# Patient Record
Sex: FEMALE | Race: WHITE | NOT HISPANIC OR LATINO | Employment: UNEMPLOYED | URBAN - METROPOLITAN AREA
[De-identification: names, ages, dates, MRNs, and addresses within clinical notes are randomized per-mention and may not be internally consistent; named-entity substitution may affect disease eponyms.]

---

## 2017-08-15 ENCOUNTER — GENERIC CONVERSION - ENCOUNTER (OUTPATIENT)
Dept: GYNECOLOGIC ONCOLOGY | Facility: CLINIC | Age: 72
End: 2017-08-15

## 2017-11-06 ENCOUNTER — GENERIC CONVERSION - ENCOUNTER (OUTPATIENT)
Dept: GYNECOLOGIC ONCOLOGY | Facility: CLINIC | Age: 72
End: 2017-11-06

## 2018-01-11 ENCOUNTER — GENERIC CONVERSION - ENCOUNTER (OUTPATIENT)
Dept: GYNECOLOGIC ONCOLOGY | Facility: CLINIC | Age: 73
End: 2018-01-11

## 2018-01-22 VITALS
SYSTOLIC BLOOD PRESSURE: 138 MMHG | RESPIRATION RATE: 16 BRPM | HEART RATE: 100 BPM | BODY MASS INDEX: 38.89 KG/M2 | WEIGHT: 242 LBS | TEMPERATURE: 97.3 F | HEIGHT: 66 IN | DIASTOLIC BLOOD PRESSURE: 88 MMHG

## 2019-01-04 DIAGNOSIS — C54.1 ENDOMETRIAL CANCER (HCC): Primary | ICD-10-CM

## 2019-01-17 ENCOUNTER — OFFICE VISIT (OUTPATIENT)
Dept: GYNECOLOGIC ONCOLOGY | Facility: CLINIC | Age: 74
End: 2019-01-17
Payer: COMMERCIAL

## 2019-01-17 VITALS
SYSTOLIC BLOOD PRESSURE: 120 MMHG | BODY MASS INDEX: 36.96 KG/M2 | HEART RATE: 100 BPM | TEMPERATURE: 97.4 F | RESPIRATION RATE: 16 BRPM | WEIGHT: 230 LBS | DIASTOLIC BLOOD PRESSURE: 68 MMHG | HEIGHT: 66 IN

## 2019-01-17 DIAGNOSIS — Z85.42 ENCOUNTER FOR FOLLOW-UP SURVEILLANCE OF ENDOMETRIAL CANCER: Primary | ICD-10-CM

## 2019-01-17 DIAGNOSIS — Z85.42 HISTORY OF ENDOMETRIAL CANCER: ICD-10-CM

## 2019-01-17 DIAGNOSIS — Z08 ENCOUNTER FOR FOLLOW-UP SURVEILLANCE OF ENDOMETRIAL CANCER: Primary | ICD-10-CM

## 2019-01-17 PROBLEM — C54.1 ENDOMETRIAL CANCER (HCC): Status: ACTIVE | Noted: 2019-01-17

## 2019-01-17 PROCEDURE — 99212 OFFICE O/P EST SF 10 MIN: CPT | Performed by: OBSTETRICS & GYNECOLOGY

## 2019-01-17 RX ORDER — METFORMIN HYDROCHLORIDE 500 MG/1
TABLET, EXTENDED RELEASE ORAL
COMMUNITY

## 2019-01-17 RX ORDER — CHLORHEXIDINE/GLYCERIN/HE-CELL
1 JELLY (GRAM) TOPICAL DAILY
COMMUNITY

## 2019-01-17 RX ORDER — MULTIVIT-MIN/IRON/FOLIC ACID/K 18-600-40
CAPSULE ORAL
COMMUNITY

## 2019-01-17 RX ORDER — LETROZOLE 2.5 MG/1
1 TABLET, FILM COATED ORAL DAILY
COMMUNITY

## 2019-01-17 RX ORDER — TELMISARTAN AND HYDROCHLORTHIAZIDE 80; 25 MG/1; MG/1
TABLET ORAL
COMMUNITY
Start: 2011-04-18

## 2019-01-17 RX ORDER — HYDROCHLOROTHIAZIDE 25 MG/1
TABLET ORAL
COMMUNITY

## 2019-01-17 RX ORDER — ANTIOX #8/OM3/DHA/EPA/LUT/ZEAX 250-2.5 MG
CAPSULE ORAL
COMMUNITY

## 2019-01-17 RX ORDER — ATORVASTATIN CALCIUM 20 MG/1
TABLET, FILM COATED ORAL
COMMUNITY
Start: 2014-02-06

## 2019-01-17 NOTE — ASSESSMENT & PLAN NOTE
78-year-old with a history of stage IB grade 3 endometrial cancer  She is clinically without evidence of disease recurrence    Her performance status is 0   1  Return in 1 year for endometrial cancer surveillance

## 2019-01-17 NOTE — PROGRESS NOTES
Assessment/Plan:    Problem List Items Addressed This Visit        Genitourinary    Encounter for follow-up surveillance of endometrial cancer - Primary    Relevant Medications    letrozole (FEMARA) 2 5 mg tablet       Other    History of endometrial cancer     73-year-old with a history of stage IB grade 3 endometrial cancer  She is clinically without evidence of disease recurrence  Her performance status is 0   1  Return in 1 year for endometrial cancer surveillance                 CHIEF COMPLAINT:  Endometrial cancer surveillance      Problem:  Cancer Staging  History of endometrial cancer  Staging form: Corpus Uteri - Carcinoma, AJCC 8th Edition  - Clinical: Stage IB (cT1b, cN0, cM0) - Signed by Will Duffy MD on 1/17/2019        Previous therapy:     History of endometrial cancer    8/2009 Initial Diagnosis     Endometrial cancer ([facility] [address] )         8/2009 Surgery     Total abdominal hysterectomy, bilateral salpingo-oophorectomy, lymph node dissection, omental biopsy  Grade 3, 95% myometrial invasion, negative nodes, positive LV SI          - 12/2010 Radiation     50 4 Gy to the pelvis              Patient ID: Claudia Calvin is a 68 y o  female  73-year-old returns for endometrial cancer surveillance  She has no new complaints  No abdominal pain, pelvic pain, or vaginal bleeding  In the interim, she has been diagnosed with type 2 diabetes  Her quality of life is good  The following portions of the patient's history were reviewed and updated as appropriate: allergies, current medications, past family history, past medical history, past social history, past surgical history and problem list     Review of Systems   Constitutional: Negative for activity change and unexpected weight change  HENT: Negative  Eyes: Negative  Respiratory: Negative  Cardiovascular: Negative  Gastrointestinal: Negative for abdominal distention and abdominal pain  Endocrine: Negative      Genitourinary: Negative for pelvic pain and vaginal bleeding  Musculoskeletal: Negative  Skin: Negative  Allergic/Immunologic: Negative  Neurological: Negative  Hematological: Negative  Psychiatric/Behavioral: Negative  Current Outpatient Prescriptions   Medication Sig Dispense Refill    atorvastatin (LIPITOR) 20 mg tablet Take by mouth      Cholecalciferol (VITAMIN D) 2000 units CAPS Take by mouth      Dulaglutide (TRULICITY) 1 5 BW/6 6OR SOPN Inject 1 5 mg under the skin once a week      Empagliflozin (JARDIANCE) 25 MG TABS Take 25 mg by mouth every morning      hydrochlorothiazide (HYDRODIURIL) 25 mg tablet Take by mouth      letrozole (FEMARA) 2 5 mg tablet Take 1 tablet by mouth daily      metFORMIN (GLUCOPHAGE-XR) 500 mg 24 hr tablet Take by mouth      Misc Natural Products (CALCIUM PYRUVATE) 600 MG CAPS Take by mouth      Multiple Vitamins-Minerals (CENTRUM ADULTS PO) Take 1 tablet by mouth daily      Multiple Vitamins-Minerals (PRESERVISION AREDS 2) CAPS Take by mouth      Omega-3 Fatty Acids ([employer] FISH OIL) 1000 MG CAPS Take 1 capsule by mouth daily      sertraline (ZOLOFT) 50 mg tablet Take by mouth      telmisartan-hydrochlorothiazide (MICARDIS HCT) 80-25 MG per tablet Take by mouth       No current facility-administered medications for this visit  Objective:    Blood pressure 120/68, pulse 100, temperature (!) 97 4 °F (36 3 °C), temperature source Oral, resp  rate 16, height 5' 6" (1 676 m), weight 104 kg (230 lb)  Body mass index is 37 12 kg/m²  Body surface area is 2 12 meters squared  Physical Exam   Constitutional: She is oriented to person, place, and time  She appears well-developed and well-nourished  No distress  HENT:   Head: Normocephalic and atraumatic  Neck: Normal range of motion  Neck supple  No thyromegaly present  Pulmonary/Chest: Effort normal    Abdominal: Soft  She exhibits no distension and no mass  There is no tenderness   There is no rebound and no guarding  Genitourinary:   Genitourinary Comments: The external female genitalia is normal  The bartholin's, uretheral and skenes glands are normal  The urethral meatus is normal (midline with no lesions)  Anus without fissure or lesion  Speculum exam reveals a grossly normal vagina  No masses, lesions,discharge or bleeding  No significant cystocele or rectocele noted  Bimanual exam notes a surgical absent cervix, uterus and adnexal structures  No masses or fullness  Bladder is without fullness, mass or tenderness  Musculoskeletal: She exhibits no edema  Lymphadenopathy:     She has no cervical adenopathy  Neurological: She is alert and oriented to person, place, and time  Skin: Skin is warm and dry  She is not diaphoretic  Psychiatric: She has a normal mood and affect   Her behavior is normal  Judgment and thought content normal

## 2020-01-09 DIAGNOSIS — Z08 ENCOUNTER FOR FOLLOW-UP SURVEILLANCE OF ENDOMETRIAL CANCER: Primary | ICD-10-CM

## 2020-01-09 DIAGNOSIS — Z85.42 ENCOUNTER FOR FOLLOW-UP SURVEILLANCE OF ENDOMETRIAL CANCER: Primary | ICD-10-CM

## 2020-01-09 DIAGNOSIS — Z85.42 HISTORY OF ENDOMETRIAL CANCER: ICD-10-CM

## 2020-01-22 ENCOUNTER — OFFICE VISIT (OUTPATIENT)
Dept: GYNECOLOGIC ONCOLOGY | Facility: HOSPITAL | Age: 75
End: 2020-01-22
Payer: COMMERCIAL

## 2020-01-22 VITALS
WEIGHT: 221 LBS | SYSTOLIC BLOOD PRESSURE: 138 MMHG | OXYGEN SATURATION: 93 % | DIASTOLIC BLOOD PRESSURE: 74 MMHG | RESPIRATION RATE: 18 BRPM | HEIGHT: 66 IN | TEMPERATURE: 98.9 F | BODY MASS INDEX: 35.52 KG/M2 | HEART RATE: 103 BPM

## 2020-01-22 DIAGNOSIS — Z85.42 HISTORY OF ENDOMETRIAL CANCER: Primary | ICD-10-CM

## 2020-01-22 PROCEDURE — 99212 OFFICE O/P EST SF 10 MIN: CPT | Performed by: OBSTETRICS & GYNECOLOGY

## 2021-01-05 DIAGNOSIS — Z85.42 HISTORY OF ENDOMETRIAL CANCER: Primary | ICD-10-CM

## 2021-01-18 NOTE — PROGRESS NOTES
Assessment/Plan:    Problem List Items Addressed This Visit        Other    History of endometrial cancer - Primary     71-year-old with a history of stage IB grade 3 endometrial cancer  She is clinically without evidence of disease recurrence  Her performance status is 0   1  Return in 1 year for endometrial cancer surveillance  Relevant Orders                CHIEF COMPLAINT:  Endometrial cancer surveillance      Problem:  Cancer Staging  History of endometrial cancer  Staging form: Corpus Uteri - Carcinoma, AJCC 8th Edition  - Clinical: Stage IB (cT1b, cN0, cM0) - Signed by Zakia Hollingsworth MD on 1/17/2019        Previous therapy:     History of endometrial cancer    8/2009 Initial Diagnosis     Endometrial cancer ([facility] [address] )      8/2009 Surgery     Total abdominal hysterectomy, bilateral salpingo-oophorectomy, lymph node dissection, omental biopsy  Grade 3, 95% myometrial invasion, negative nodes, positive LV SI       - 12/2010 Radiation     50 4 Gy to the pelvis           Patient ID: Asher Bhandari is a 69 y o  female  who has no new complaints today  No vaginal bleeding, abdominal/pelvic pain  Normal bowel and bladder function  No interval change in medical history since last visit  Quality of life is good  She had a  performed approximately 3 weeks ago which was 8 5 units/ml  The following portions of the patient's history were reviewed and updated as appropriate: allergies, current medications, past family history, past medical history, past social history, past surgical history and problem list     Review of Systems   Constitutional: Negative for activity change and unexpected weight change  HENT: Negative  Eyes: Negative  Respiratory: Negative  Cardiovascular: Negative  Gastrointestinal: Negative for abdominal distention and abdominal pain  Endocrine: Negative  Genitourinary: Negative for pelvic pain and vaginal bleeding  Musculoskeletal: Negative      Skin: Negative  Allergic/Immunologic: Negative  Neurological: Negative  Hematological: Negative  Psychiatric/Behavioral: Negative  Current Outpatient Medications   Medication Sig Dispense Refill    atorvastatin (LIPITOR) 20 mg tablet Take by mouth      Cholecalciferol (VITAMIN D) 2000 units CAPS Take by mouth      Dulaglutide (TRULICITY) 1 5 UV/2 1OJ SOPN Inject 1 5 mg under the skin once a week      Empagliflozin (JARDIANCE) 25 MG TABS Take 25 mg by mouth every morning      hydrochlorothiazide (HYDRODIURIL) 25 mg tablet Take by mouth      letrozole (FEMARA) 2 5 mg tablet Take 1 tablet by mouth daily      metFORMIN (GLUCOPHAGE-XR) 500 mg 24 hr tablet Take by mouth      Misc Natural Products (CALCIUM PYRUVATE) 600 MG CAPS Take by mouth      Multiple Vitamins-Minerals (CENTRUM ADULTS PO) Take 1 tablet by mouth daily      Multiple Vitamins-Minerals (PRESERVISION AREDS 2) CAPS Take by mouth      Omega-3 Fatty Acids ([employer]S FISH OIL) 1000 MG CAPS Take 1 capsule by mouth daily      sertraline (ZOLOFT) 50 mg tablet Take by mouth      telmisartan-hydrochlorothiazide (MICARDIS HCT) 80-25 MG per tablet Take by mouth       No current facility-administered medications for this visit  Objective:    Blood pressure 138/74, pulse 103, temperature 98 9 °F (37 2 °C), temperature source Oral, resp  rate 18, height 5' 6" (1 676 m), weight 100 kg (221 lb), SpO2 93 %  Body mass index is 35 67 kg/m²  Body surface area is 2 08 meters squared  Physical Exam   Constitutional: She is oriented to person, place, and time  She appears well-developed and well-nourished  No distress  HENT:   Head: Normocephalic and atraumatic  Neck: Normal range of motion  Neck supple  No thyromegaly present  Pulmonary/Chest: Effort normal    Abdominal: Soft  She exhibits no distension and no mass  There is no tenderness  There is no rebound and no guarding  Genitourinary:   Genitourinary Comments:  The external female genitalia is normal  The bartholin's, uretheral and skenes glands are normal  The urethral meatus is normal (midline with no lesions)  Anus without fissure or lesion  Speculum exam reveals radiation change to the vagina, normal length  No masses, lesions,discharge or bleeding  No significant cystocele or rectocele noted  Bimanual exam notes a surgical absent cervix, uterus and adnexal structures  No masses or fullness  Bladder is without fullness, mass or tenderness  Musculoskeletal: She exhibits no edema  Lymphadenopathy:     She has no cervical adenopathy  Neurological: She is alert and oriented to person, place, and time  Skin: Skin is warm and dry  She is not diaphoretic  Psychiatric: She has a normal mood and affect   Her behavior is normal  Judgment and thought content normal  no

## 2021-04-01 LAB — CANCER AG125 SERPL-ACNC: 8.5 U/ML (ref 0–38.1)

## 2021-04-07 ENCOUNTER — OFFICE VISIT (OUTPATIENT)
Dept: GYNECOLOGIC ONCOLOGY | Facility: HOSPITAL | Age: 76
End: 2021-04-07
Payer: COMMERCIAL

## 2021-04-07 VITALS
HEART RATE: 91 BPM | BODY MASS INDEX: 35.36 KG/M2 | TEMPERATURE: 97.7 F | HEIGHT: 66 IN | RESPIRATION RATE: 18 BRPM | SYSTOLIC BLOOD PRESSURE: 136 MMHG | DIASTOLIC BLOOD PRESSURE: 78 MMHG | WEIGHT: 220 LBS

## 2021-04-07 DIAGNOSIS — Z85.42 HISTORY OF ENDOMETRIAL CANCER: ICD-10-CM

## 2021-04-07 DIAGNOSIS — Z08 ENCOUNTER FOR FOLLOW-UP SURVEILLANCE OF ENDOMETRIAL CANCER: Primary | ICD-10-CM

## 2021-04-07 DIAGNOSIS — Z85.42 ENCOUNTER FOR FOLLOW-UP SURVEILLANCE OF ENDOMETRIAL CANCER: Primary | ICD-10-CM

## 2021-04-07 PROCEDURE — 99212 OFFICE O/P EST SF 10 MIN: CPT | Performed by: OBSTETRICS & GYNECOLOGY

## 2021-04-07 NOTE — ASSESSMENT & PLAN NOTE
80-year-old with a history of stage I B grade endometrial cancer  She is now 10 years out from her diagnosis  She is clinically without evidence of disease recurrence  Her performance status is 0   1  I encouraged her to be evaluated to fix her ventral hernia  2  She can return as needed  She understands that she is at low risk for recurrence

## 2021-04-07 NOTE — PROGRESS NOTES
Assessment/Plan:    Problem List Items Addressed This Visit        Other    History of endometrial cancer     50-year-old with a history of stage I B grade endometrial cancer  She is now 10 years out from her diagnosis  She is clinically without evidence of disease recurrence  Her performance status is 0   1  I encouraged her to be evaluated to fix her ventral hernia  2  She can return as needed  She understands that she is at low risk for recurrence  Encounter for follow-up surveillance of endometrial cancer - Primary            CHIEF COMPLAINT:  Here for endometrial cancer surveillance      Problem:  Cancer Staging  History of endometrial cancer  Staging form: Corpus Uteri - Carcinoma, AJCC 8th Edition  - Clinical: Stage IB (cT1b, cN0, cM0) - Signed by Bang Villela MD on 1/17/2019        Previous therapy:  Oncology History   History of endometrial cancer   8/2009 Initial Diagnosis    Endometrial cancer ([facility] [address] )     8/2009 Surgery    Total abdominal hysterectomy, bilateral salpingo-oophorectomy, lymph node dissection, omental biopsy  Grade 3, 95% myometrial invasion, negative nodes, positive LV SI      - 12/2010 Radiation    50 4 Gy to the pelvis           Patient ID: Carlitos Parson is a 77 y o  female  who has no new complaints today  No vaginal bleeding, abdominal/pelvic pain  Normal bowel and bladder function  No interval change in medical history since last visit  Quality of life is good  She receives follow-up for her breast cancer diagnosis  She has been without evidence of disease for more than 10 years  The following portions of the patient's history were reviewed and updated as appropriate: allergies, current medications, past family history, past medical history, past social history, past surgical history and problem list     Review of Systems   Constitutional: Negative for activity change and unexpected weight change  HENT: Negative  Eyes: Negative  Respiratory: Negative  Cardiovascular: Negative  Gastrointestinal: Negative for abdominal distention and abdominal pain  Endocrine: Negative  Genitourinary: Negative for pelvic pain and vaginal bleeding  Musculoskeletal: Negative  Skin: Negative  Allergic/Immunologic: Negative  Neurological: Negative  Hematological: Negative  Psychiatric/Behavioral: Negative  Current Outpatient Medications   Medication Sig Dispense Refill    atorvastatin (LIPITOR) 20 mg tablet Take by mouth      Cholecalciferol (VITAMIN D) 2000 units CAPS Take by mouth      Dulaglutide (TRULICITY) 1 5 BX/5 7WG SOPN Inject 1 5 mg under the skin once a week      Empagliflozin (JARDIANCE) 25 MG TABS Take 25 mg by mouth every morning      hydrochlorothiazide (HYDRODIURIL) 25 mg tablet Take by mouth      letrozole (FEMARA) 2 5 mg tablet Take 1 tablet by mouth daily      metFORMIN (GLUCOPHAGE-XR) 500 mg 24 hr tablet Take by mouth      Misc Natural Products (CALCIUM PYRUVATE) 600 MG CAPS Take by mouth      Multiple Vitamins-Minerals (CENTRUM ADULTS PO) Take 1 tablet by mouth daily      Multiple Vitamins-Minerals (PRESERVISION AREDS 2) CAPS Take by mouth      Omega-3 Fatty Acids (CVS FISH OIL) 1000 MG CAPS Take 1 capsule by mouth daily      sertraline (ZOLOFT) 50 mg tablet Take by mouth      telmisartan-hydrochlorothiazide (MICARDIS HCT) 80-25 MG per tablet Take by mouth       No current facility-administered medications for this visit  Objective:    Blood pressure 136/78, pulse 91, temperature 97 7 °F (36 5 °C), temperature source Temporal, resp  rate 18, height 5' 6" (1 676 m), weight 99 8 kg (220 lb)  Body mass index is 35 51 kg/m²  Body surface area is 2 08 meters squared  Physical Exam  Vitals signs reviewed  Exam conducted with a chaperone present  Constitutional:       General: She is not in acute distress  Appearance: Normal appearance  She is well-developed  She is obese   She is not ill-appearing, toxic-appearing or diaphoretic  HENT:      Head: Normocephalic and atraumatic  Eyes:      General: No scleral icterus  Right eye: No discharge  Left eye: No discharge  Extraocular Movements: Extraocular movements intact  Conjunctiva/sclera: Conjunctivae normal    Neck:      Musculoskeletal: Normal range of motion and neck supple  No neck rigidity or muscular tenderness  Thyroid: No thyromegaly  Pulmonary:      Effort: Pulmonary effort is normal    Abdominal:      General: There is no distension  Palpations: Abdomen is soft  There is no mass  Tenderness: There is no abdominal tenderness  There is no guarding or rebound  Hernia: A hernia is present  Comments: 15cm hernia adjacent to incision  Reducible  Genitourinary:     Comments: The external female genitalia is normal  The bartholin's, uretheral and skenes glands are normal  The urethral meatus is normal (midline with no lesions)  Anus without fissure or lesion  Speculum exam reveals a grossly normal vagina  No masses, lesions,discharge or bleeding  No significant cystocele or rectocele noted  Bimanual exam notes a surgical absent cervix, uterus and adnexal structures  No masses or fullness  Bladder is without fullness, mass or tenderness  Musculoskeletal:         General: No swelling or deformity  Right lower leg: No edema  Left lower leg: No edema  Lymphadenopathy:      Cervical: No cervical adenopathy  Skin:     General: Skin is warm and dry  Coloration: Skin is not jaundiced or pale  Findings: No bruising, erythema or rash  Neurological:      General: No focal deficit present  Mental Status: She is alert and oriented to person, place, and time  Cranial Nerves: No cranial nerve deficit  Psychiatric:         Mood and Affect: Mood normal          Behavior: Behavior normal          Thought Content:  Thought content normal          Judgment: Judgment normal  Lab Results   Component Value Date     8 5 03/31/2021